# Patient Record
Sex: MALE | Race: WHITE | NOT HISPANIC OR LATINO
[De-identification: names, ages, dates, MRNs, and addresses within clinical notes are randomized per-mention and may not be internally consistent; named-entity substitution may affect disease eponyms.]

---

## 2018-07-11 ENCOUNTER — TRANSCRIPTION ENCOUNTER (OUTPATIENT)
Age: 53
End: 2018-07-11

## 2018-10-03 ENCOUNTER — OUTPATIENT (OUTPATIENT)
Dept: OUTPATIENT SERVICES | Facility: HOSPITAL | Age: 53
LOS: 1 days | Discharge: HOME | End: 2018-10-03

## 2018-10-03 DIAGNOSIS — R97.20 ELEVATED PROSTATE SPECIFIC ANTIGEN [PSA]: ICD-10-CM

## 2020-01-23 ENCOUNTER — TRANSCRIPTION ENCOUNTER (OUTPATIENT)
Age: 55
End: 2020-01-23

## 2021-07-26 ENCOUNTER — LABORATORY RESULT (OUTPATIENT)
Age: 56
End: 2021-07-26

## 2021-07-26 ENCOUNTER — APPOINTMENT (OUTPATIENT)
Dept: DISASTER EMERGENCY | Facility: CLINIC | Age: 56
End: 2021-07-26

## 2021-07-26 PROBLEM — Z00.00 ENCOUNTER FOR PREVENTIVE HEALTH EXAMINATION: Status: ACTIVE | Noted: 2021-07-26

## 2021-10-14 ENCOUNTER — TRANSCRIPTION ENCOUNTER (OUTPATIENT)
Age: 56
End: 2021-10-14

## 2022-04-07 ENCOUNTER — APPOINTMENT (OUTPATIENT)
Age: 57
End: 2022-04-07
Payer: COMMERCIAL

## 2022-04-07 VITALS
SYSTOLIC BLOOD PRESSURE: 120 MMHG | BODY MASS INDEX: 31.97 KG/M2 | HEIGHT: 72 IN | HEART RATE: 88 BPM | OXYGEN SATURATION: 98 % | DIASTOLIC BLOOD PRESSURE: 76 MMHG | RESPIRATION RATE: 14 BRPM | WEIGHT: 236 LBS

## 2022-04-07 PROCEDURE — 99213 OFFICE O/P EST LOW 20 MIN: CPT

## 2022-04-07 NOTE — REASON FOR VISIT
[Abnormal CXR/ Chest CT] : an abnormal CXR/ chest CT [Pulmonary Nodules] : pulmonary nodules [TextBox_44] : History of COVID has been doing pretty well.  He has had several nodular densities which have been stable.  Continues to be a heavy smoker however he is needs to have a repeat CAT scan of his chest.

## 2022-04-29 ENCOUNTER — NON-APPOINTMENT (OUTPATIENT)
Age: 57
End: 2022-04-29

## 2022-06-06 ENCOUNTER — APPOINTMENT (OUTPATIENT)
Dept: ORTHOPEDIC SURGERY | Facility: CLINIC | Age: 57
End: 2022-06-06
Payer: OTHER MISCELLANEOUS

## 2022-06-06 VITALS — BODY MASS INDEX: 31.97 KG/M2 | WEIGHT: 236 LBS | HEIGHT: 72 IN

## 2022-06-06 PROCEDURE — 99455 WORK RELATED DISABILITY EXAM: CPT

## 2022-06-06 PROCEDURE — 99072 ADDL SUPL MATRL&STAF TM PHE: CPT

## 2022-06-07 NOTE — WORK
[Has the patient reached Maximum Medical Improvement? If yes, indicate date___] : Yes, on [unfilled] [Is there permanent partial impairment?] : Yes [Right] : right [Yes] : Yes [FreeTextEntry8] : in exam [FreeTextEntry7] : knee [FreeTextEntry5] : 20% [de-identified] : 0-140

## 2022-06-07 NOTE — PHYSICAL EXAM
[Right] : right knee [TWNoteComboBox7] : flexion 105 degrees [de-identified] : extension 15 degrees

## 2022-12-05 ENCOUNTER — NON-APPOINTMENT (OUTPATIENT)
Age: 57
End: 2022-12-05

## 2023-01-11 ENCOUNTER — APPOINTMENT (OUTPATIENT)
Age: 58
End: 2023-01-11
Payer: COMMERCIAL

## 2023-01-11 PROCEDURE — 94010 BREATHING CAPACITY TEST: CPT

## 2023-01-11 PROCEDURE — 94727 GAS DIL/WSHOT DETER LNG VOL: CPT

## 2023-01-11 PROCEDURE — 94729 DIFFUSING CAPACITY: CPT

## 2023-03-04 ENCOUNTER — NON-APPOINTMENT (OUTPATIENT)
Age: 58
End: 2023-03-04

## 2023-03-14 ENCOUNTER — NON-APPOINTMENT (OUTPATIENT)
Age: 58
End: 2023-03-14

## 2023-04-24 ENCOUNTER — APPOINTMENT (OUTPATIENT)
Dept: ORTHOPEDIC SURGERY | Facility: CLINIC | Age: 58
End: 2023-04-24
Payer: OTHER MISCELLANEOUS

## 2023-04-24 PROCEDURE — 72110 X-RAY EXAM L-2 SPINE 4/>VWS: CPT

## 2023-04-24 PROCEDURE — 72050 X-RAY EXAM NECK SPINE 4/5VWS: CPT

## 2023-04-24 PROCEDURE — 99214 OFFICE O/P EST MOD 30 MIN: CPT

## 2023-04-24 NOTE — PHYSICAL EXAM
[de-identified] : TTP midline cervical spine and paraspinal musculature \par \par Strength                                                                    \par Deltoid\par   Right: 5/5; Left: 5/5                     \par Biceps\par   Right: 5/5; Left: 5/5                  \par Triceps     \par   Right: 5/5; Left: 5/5                                \par Wrist Extensors  \par   Right: 5/5; Left: 5/5\par Finger Flexors  \par   Right: 5/5; Left: 5/5\par IO \par   Right: 5/5; Left: 5/5\par \par Sensation\par C5\par   Right: 2/2; Left: 2/2\par C6\par   Right: 2/2; Left: 2/2\par C7\par   Right: 2/2; Left: 2/2\par C8\par   Right: 2/2; Left: 2/2\par T1\par   Right: 2/2; Left: 2/2\par \par Reflexes\par Biceps\par   Right: 2+; Left 2+\par Triceps\par   Right: 2+; Left 2+\par Martin's\par  Right: Negative; L: Negative\par \par TTP midline spine and paraspinal musculature \par Strength                                         \par Hip flexor\par   Right: 5/5; Left: 5/5                             \par Knee extensor  \par   Right: 5/5; Left: 5/5                     \par Ankle dorsiflexion\par   Right: 5/5; Left: 5/5                  \par EHL        \par   Right: 5/5; Left: 5/5                                \par Ankle plantarflexion    \par   Right: 5/5; Left: 5/5\par \par Sensation\par L1\par   Right: 2/2; Left: 2/2\par L2\par   Right: 2/2; Left: 2/2\par L3\par   Right: 2/2; Left: 2/2\par L4\par   Right: 2/2; Left: 2/2\par L5\par   Right: 2/2; Left: 2/2\par S1\par   Right: 2/2; Left: 2/2\par \par Reflexes\par Patella\par   Right: 2+; Left 2+\par Achilles\par   Right: 2+; Left 2+\par Clonus\par  Right: absent; L: absent\par

## 2023-04-24 NOTE — DISCUSSION/SUMMARY
[de-identified] : 58-year-old male with lumbar and cervical radiculopathy secondary to a work-related accident.  Given the patient prescription for physical therapy he will follow-up with interventional pain management Dr. Wright.  The patient is unable to work given that the pain is severe.  He has no light duty at his job as a  at the [employer].  The patient is temporarily 100% disabled as a result of his work related injury.  I advised him to stay out of work until his symptoms improve after physical therapy and injections.

## 2023-04-24 NOTE — DATA REVIEWED
[FreeTextEntry1] : I obtained and reviewed AP lateral flexion-extension lumbar x-rays and cervical x-rays.  The cervical spine the patient has significant degeneration at C5-6 C6-7.  There is mild to moderate disc degeneration lumbar spine.  No instability.

## 2023-04-24 NOTE — HISTORY OF PRESENT ILLNESS
[de-identified] : 58-year-old male presents status post work-related accident March 3, 2023.  Patient presented today.  He has neck pain that radiates to his upper shoulders.  He has lumbar pain that radiates into his left lateral thigh.  In the past he went all the way down his right leg.  The pain is very significant and interferes with his day-to-day.  The patient is not on physical therapy.  He denies any balance issues or bladder loss of bowel.  Denies hand clumsiness.  Has not had any recent injections.  He did have a cervical epidural steroid injection years ago and he did get relief from that.

## 2023-05-24 ENCOUNTER — APPOINTMENT (OUTPATIENT)
Dept: PAIN MANAGEMENT | Facility: CLINIC | Age: 58
End: 2023-05-24
Payer: OTHER MISCELLANEOUS

## 2023-05-24 VITALS — WEIGHT: 215 LBS | BODY MASS INDEX: 30.1 KG/M2 | HEIGHT: 71 IN

## 2023-05-24 VITALS — HEIGHT: 71 IN | BODY MASS INDEX: 30.1 KG/M2 | WEIGHT: 215 LBS

## 2023-05-24 PROCEDURE — 96102W: CUSTOM

## 2023-05-24 PROCEDURE — 99244 OFF/OP CNSLTJ NEW/EST MOD 40: CPT

## 2023-05-24 NOTE — PHYSICAL EXAM
[de-identified] : GENERAL APPEARANCE OF PATIENT IS WELL DEVELOPED, WELL NOURISHED, BODY HABITUS NORMAL, WELL GROOMED, NO DEFORMITIES NOTED. \par Head - Atraumatic and Normocephalic \par Eyes, Nose, and Throat: External inspection of ears and nose are normal overall without scars, lesions, or masses noted. Assessment of hearing is normal\par Neck-Examination of neck shows no masses, overall appearance is normal, neck is symmetric, tracheal position is midline, no crepitus is noted. Examination of thyroid shows no enlargement, tenderness or masses\par Respiratory- Assessment of respiratory effort shows no intercostal retractions, no use of accessory muscles, unlabored breathing, and normal diaphragmatic movement.\par Cardiovascular- Examination of extremities show no edema or varicosities\par Musculoskeletal. Examination of gait is not antalgic and station is normal\par Inspection and palpation of digits and nails shows no clubbing, cyanosis, nodules, drainage, fluctuance, petechiae\par \par • Spine – 90 degrees in flexion. 10-15 degrees in extension with pain. \par \par \par • Neck- pain at 10 degrees in both flexion and extension. Trigger point in the right cervical paravertebral area. Pain on right C3-6 facets. \par \par \par • RUE- inspection and palpation shows no misalignment, asymmetry, crepitation, defects, tenderness, masses, effusions. ROM is normal without crepitation or contracture. No instability or subluxation or laxity is noted. No abnormal movements.\par \par \par • LUE- inspection and palpation shows no misalignment, asymmetry, crepitation, defects, tenderness, masses, effusions. ROM is normal without crepitation or contracture. No instability or subluxation or laxity is noted. No abnormal movements.\par \par \par • RLE- inspection and palpation shows no misalignment, asymmetry, crepitation, defects, tenderness, masses, effusions. ROM is normal without crepitation or contracture. No instability or subluxation or laxity is noted. No abnormal movements.\par \par \par • LLE- inspection and palpation shows no misalignment, asymmetry, crepitation, defects, tenderness, masses, effusions. ROM is normal without crepitation or contracture. No instability or subluxation or laxity is noted. No abnormal movements.\par \par \par • Skin- Inspection of skin and subcutaneous tissue shows no rashes, lesions or ulcers. Palpation of skin and subcutaneous tissue shows no rashes, no indurations, subcutaneous nodules or tightening.\par \par \par • Abdomen- Nontender\par \par \par • Neurologic- CN 2-12 are grossly intact. No sensory or motor deficits in the upper and lower extremities. Adequate strength in upper and lower extremities \par \par \par • Psychiatric- Patient’s judgment and insight are intact. Oriented to time, place and person. Recent and remote memory intact.\par

## 2023-05-24 NOTE — HISTORY OF PRESENT ILLNESS
[FreeTextEntry1] : HISTORY OF PRESENT ILLNESS: This is a 58 year old man complaining of neck and lower back pain. The patient has had this pain for 3 months. The pain started after a work related injury which took place on 03/03/2023. Patient describes pain as moderate to severe. During the last month the pain has been nearly constant with symptoms worsening in no typical pattern. Pain described as sharp and shooting. Pain is not associated with numbness or weakness.  Pain is increased with lying down, standing, sitting, walking, exercise, and coughing/sneezing. Bowel or bladder habits have not changed.\par  \par ACTIVITIES: Patient could walk 1 block before the pain starts. Patient can sit 1 hour  before pain starts. Patient can stand 30 minutes before pain starts. The patient sometimes lays down because of pain. Patient uses no assistive walking device at this time. Patient has difficulty going to work, performing household chores, doing yard work or shopping, participating in recreational activities, and exercising at this time.\par \par PRIOR PAIN TREATMENTS:  No prior pain treatment\par \par PRIOR PAIN MEDICATIONS:  Tylenol\par

## 2023-05-24 NOTE — ASSESSMENT
[FreeTextEntry1] : Mr. Pool Ulloa is a 58 year old male with a chief complaint of neck and lower back pain. Pain resulted from a work related injury which took place on 3/3/23. He was working for the [employer] lifting and throwing heavy objects when he injured his back and neck. He had x-rays of the cervical and lumbar spine which were reviewed in detail during today's encounter. \par \par At this time we will order MRIs of the cervical and lumbar spine. A cervical and lumbar MRI were ordered to delineate spine pathology such as disc displacement and stenosis. Patient will follow up in 4 weeks to review imaging results and discuss further treatment options. \par \par Disability status: total temporary disabled \par \par I, Evi Montenegro, attest that this documentation has been prepared under the direction and in the presence of Provider William Wright DO\par The documentation recorded by the scribe, in my presence, accurately reflects the service I personally performed, and the decisions made by me with my edits as appropriate.\par \par Best Regards, \par William Wright D.ZANE. \par Diplomat, American Board of Anesthesiology \par Diplomat, American Board of Pain Medicine \par Diplomat, American Board of Pain Management

## 2023-06-21 ENCOUNTER — APPOINTMENT (OUTPATIENT)
Dept: PAIN MANAGEMENT | Facility: CLINIC | Age: 58
End: 2023-06-21
Payer: OTHER MISCELLANEOUS

## 2023-06-21 VITALS — BODY MASS INDEX: 30.1 KG/M2 | HEIGHT: 71 IN | WEIGHT: 215 LBS

## 2023-06-21 PROCEDURE — 99214 OFFICE O/P EST MOD 30 MIN: CPT

## 2023-06-21 NOTE — PHYSICAL EXAM
[de-identified] : GENERAL APPEARANCE OF PATIENT IS WELL DEVELOPED, WELL NOURISHED, BODY HABITUS NORMAL, WELL GROOMED, NO DEFORMITIES NOTED. \par Head - Atraumatic and Normocephalic \par Eyes, Nose, and Throat: External inspection of ears and nose are normal overall without scars, lesions, or masses noted. Assessment of hearing is normal\par Neck-Examination of neck shows no masses, overall appearance is normal, neck is symmetric, tracheal position is midline, no crepitus is noted. Examination of thyroid shows no enlargement, tenderness or masses\par Respiratory- Assessment of respiratory effort shows no intercostal retractions, no use of accessory muscles, unlabored breathing, and normal diaphragmatic movement.\par Cardiovascular- Examination of extremities show no edema or varicosities\par Musculoskeletal. Examination of gait is not antalgic and station is normal\par Inspection and palpation of digits and nails shows no clubbing, cyanosis, nodules, drainage, fluctuance, petechiae\par \par • Spine – 90 degrees in flexion. 10-15 degrees in extension with pain. \par \par \par • Neck- pain at 10 degrees in both flexion and extension. Trigger point in the right cervical paravertebral area. Pain on right C3-6 facets. \par \par \par • RUE- inspection and palpation shows no misalignment, asymmetry, crepitation, defects, tenderness, masses, effusions. ROM is normal without crepitation or contracture. No instability or subluxation or laxity is noted. No abnormal movements.\par \par \par • LUE- inspection and palpation shows no misalignment, asymmetry, crepitation, defects, tenderness, masses, effusions. ROM is normal without crepitation or contracture. No instability or subluxation or laxity is noted. No abnormal movements.\par \par \par • RLE- inspection and palpation shows no misalignment, asymmetry, crepitation, defects, tenderness, masses, effusions. ROM is normal without crepitation or contracture. No instability or subluxation or laxity is noted. No abnormal movements.\par \par \par • LLE- inspection and palpation shows no misalignment, asymmetry, crepitation, defects, tenderness, masses, effusions. ROM is normal without crepitation or contracture. No instability or subluxation or laxity is noted. No abnormal movements.\par \par \par • Skin- Inspection of skin and subcutaneous tissue shows no rashes, lesions or ulcers. Palpation of skin and subcutaneous tissue shows no rashes, no indurations, subcutaneous nodules or tightening.\par \par \par • Abdomen- Nontender\par \par \par • Neurologic- CN 2-12 are grossly intact. No sensory or motor deficits in the upper and lower extremities. Adequate strength in upper and lower extremities \par \par \par • Psychiatric- Patient’s judgment and insight are intact. Oriented to time, place and person. Recent and remote memory intact.\par

## 2023-06-21 NOTE — ASSESSMENT
[FreeTextEntry1] : Mr. Pool Ulloa is a 58 year old male with a chief complaint of neck and lower back pain. Pain resulted from a work related injury which took place on 3/3/23. He was working for the [employer] lifting and throwing heavy objects when he injured his back and neck. He has been trialing physical therapy and completed 6 sessions with no relief. He has also trialed motrin and tylenol with no relief of pain. He is about 3 months out from his injury and is not improving. \par \par At this time we will order MRIs of the cervical and lumbar spine. A cervical and lumbar MRI were ordered to delineate spine pathology such as disc displacement and stenosis. Patient will follow up in 4 weeks to review imaging results and discuss further treatment options. \par \par Disability status: total temporary disabled \par \par I, Evi Montenegro, attest that this documentation has been prepared under the direction and in the presence of Provider William Wright DO\par The documentation recorded by the scribe, in my presence, accurately reflects the service I personally performed, and the decisions made by me with my edits as appropriate.\par \par Best Regards, \par William Wright D.O. \par Diplomat, American Board of Anesthesiology \par Diplomat, American Board of Pain Medicine \par Diplomat, American Board of Pain Management

## 2023-06-21 NOTE — HISTORY OF PRESENT ILLNESS
[FreeTextEntry1] : HISTORY OF PRESENT ILLNESS: This is a 58 year old man complaining of neck and lower back pain. The patient has had this pain for 3 months. The pain started after a work related injury which took place on 03/03/2023. 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 Patient describes pain as moderate to severe. During the last month the pain has been nearly constant with symptoms worsening in no typical pattern. Pain described as sharp and shooting. Pain is not associated with numbness or weakness.  Pain is increased with lying down, standing, sitting, walking, exercise, and coughing/sneezing. Bowel or bladder habits have not changed.\par  \par ACTIVITIES: Patient could walk 1 block before the pain starts. Patient can sit 1 hour  before pain starts. Patient can stand 30 minutes before pain starts. The patient sometimes lays down because of pain. Patient uses no assistive walking device at this time. Patient has difficulty going to work, performing household chores, doing yard work or shopping, participating in recreational activities, and exercising at this time.\par \par PRIOR PAIN TREATMENTS:  No prior pain treatment\par \par PRIOR PAIN MEDICATIONS:  Tylenol\par \par PRESENTING TODAY: In revisit encounter in regard to his continued neck and lower back pain. He has been trialing physical therapy with no relief. He has also trialed motrin and tylenol with no relief of pain. He is about 3 months out from his injury and is not improving. \par

## 2023-06-26 ENCOUNTER — NON-APPOINTMENT (OUTPATIENT)
Age: 58
End: 2023-06-26

## 2023-07-23 ENCOUNTER — FORM ENCOUNTER (OUTPATIENT)
Age: 58
End: 2023-07-23

## 2023-07-31 ENCOUNTER — APPOINTMENT (OUTPATIENT)
Dept: MRI IMAGING | Facility: CLINIC | Age: 58
End: 2023-07-31

## 2023-08-02 ENCOUNTER — APPOINTMENT (OUTPATIENT)
Dept: PAIN MANAGEMENT | Facility: CLINIC | Age: 58
End: 2023-08-02

## 2023-08-15 ENCOUNTER — APPOINTMENT (OUTPATIENT)
Dept: PAIN MANAGEMENT | Facility: CLINIC | Age: 58
End: 2023-08-15
Payer: OTHER MISCELLANEOUS

## 2023-08-15 VITALS
HEART RATE: 57 BPM | HEIGHT: 71 IN | SYSTOLIC BLOOD PRESSURE: 152 MMHG | DIASTOLIC BLOOD PRESSURE: 93 MMHG | WEIGHT: 215 LBS | BODY MASS INDEX: 30.1 KG/M2

## 2023-08-15 PROCEDURE — 99214 OFFICE O/P EST MOD 30 MIN: CPT | Mod: ACP

## 2023-08-15 NOTE — PHYSICAL EXAM
[] : pain with lateral rotation [Flexion] : flexion [Extension] : extension [de-identified] : GENERAL APPEARANCE OF PATIENT IS WELL DEVELOPED, WELL NOURISHED, BODY HABITUS NORMAL, WELL GROOMED, NO DEFORMITIES NOTED. \par  Head - Atraumatic and Normocephalic \par  Eyes, Nose, and Throat: External inspection of ears and nose are normal overall without scars, lesions, or masses noted. Assessment of hearing is normal\par  Neck-Examination of neck shows no masses, overall appearance is normal, neck is symmetric, tracheal position is midline, no crepitus is noted. Examination of thyroid shows no enlargement, tenderness or masses\par  Respiratory- Assessment of respiratory effort shows no intercostal retractions, no use of accessory muscles, unlabored breathing, and normal diaphragmatic movement.\par  Cardiovascular- Examination of extremities show no edema or varicosities\par  Musculoskeletal. Examination of gait is not antalgic and station is normal\par  Inspection and palpation of digits and nails shows no clubbing, cyanosis, nodules, drainage, fluctuance, petechiae\par  \par  - Spine - 90 degrees in flexion. 10-15 degrees in extension with pain. \par  \par  \par  - Neck- pain at 10 degrees in both flexion and extension. Trigger point in the right cervical paravertebral area. Pain on right C3-6 facets. \par  \par  \par  - RUE- inspection and palpation shows no misalignment, asymmetry, crepitation, defects, tenderness, masses, effusions. ROM is normal without crepitation or contracture. No instability or subluxation or laxity is noted. No abnormal movements.\par  \par  \par  - LUE- inspection and palpation shows no misalignment, asymmetry, crepitation, defects, tenderness, masses, effusions. ROM is normal without crepitation or contracture. No instability or subluxation or laxity is noted. No abnormal movements.\par  \par  \par  - RLE- inspection and palpation shows no misalignment, asymmetry, crepitation, defects, tenderness, masses, effusions. ROM is normal without crepitation or contracture. No instability or subluxation or laxity is noted. No abnormal movements.\par  \par  \par  - LLE- inspection and palpation shows no misalignment, asymmetry, crepitation, defects, tenderness, masses, effusions. ROM is normal without crepitation or contracture. No instability or subluxation or laxity is noted. No abnormal movements.\par  \par  \par  - Skin- Inspection of skin and subcutaneous tissue shows no rashes, lesions or ulcers. Palpation of skin and subcutaneous tissue shows no rashes, no indurations, subcutaneous nodules or tightening.\par  \par  \par  - Abdomen- Nontender\par  \par  \par  - Neurologic- CN 2-12 are grossly intact. No sensory or motor deficits in the upper and lower extremities. Adequate strength in upper and lower extremities \par  \par  \par  - Psychiatric- Patient's judgment and insight are intact. Oriented to time, place and person. Recent and remote memory intact.\par

## 2023-08-15 NOTE — DATA REVIEWED
[FreeTextEntry1] : LS MRI 08/09/23: L4-5 moderate sentral canal stenosis with b/l L5 abutement; L2-3 central canal stenosis with L3 abutement.  CS MRI 08/0-9/23: C3-4, C4-5,C5-6,C6-7 foramenal stenosis.

## 2023-08-15 NOTE — HISTORY OF PRESENT ILLNESS
[FreeTextEntry1] : HISTORY OF PRESENT ILLNESS: This is a 58 year old man complaining of neck and lower back pain. The patient has had this pain for 3 months. The pain started after a work related injury which took place on 03/03/2023. 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 Patient describes pain as moderate to severe. During the last month the pain has been nearly constant with symptoms worsening in no typical pattern. Pain described as sharp and shooting. Pain is not associated with numbness or weakness.  Pain is increased with lying down, standing, sitting, walking, exercise, and coughing/sneezing. Bowel or bladder habits have not changed.   ACTIVITIES: Patient could walk 1 block before the pain starts. Patient can sit 1 hour  before pain starts. Patient can stand 30 minutes before pain starts. The patient sometimes lays down because of pain. Patient uses no assistive walking device at this time. Patient has difficulty going to work, performing household chores, doing yard work or shopping, participating in recreational activities, and exercising at this time.  PRIOR PAIN TREATMENTS:  No prior pain treatment  PRIOR PAIN MEDICATIONS:  Tylenol  PRESENTING TODAY: Last seen on 06/21/2023 and today presents with persistent neck and low back pain. He tried over six weeks of  Physical Therapy with no relief. He has also tried Motrin and Tylenol with no relief of pain. He is about 6 months out from his injury and is not improving.  He underwent Lumbar and Cervical spine MRI which we reviewed today. Based on the results it is strongly recommended to try epidural injections, which we will request today.

## 2023-08-15 NOTE — ASSESSMENT
[FreeTextEntry1] : Mr. Pool Ulloa is a 58 year old male with a chief complaint of neck and lower back pain. Pain resulted from a work related injury which took place on 3/3/23. He was working for the [employer] lifting and throwing heavy objects when he injured his back and neck. He has been trialing physical therapy and completed 6 weeks with no relief. He has also trialed Motrin and Tylenol with no relief of pain. He is about 3 months out from his injury and is not improving. Based on the results of MRIs we will request epidural injections.  CERVICAL EPIDURAL STEROID INJECTION: Patient had a MRI that shows a radicular component along with pain referred into the upper extremity. Patient has trialed rehab (Home exercise, physical therapy or chiropractic care) and medications. I will schedule a C4-C6 cervical epidural steroid injection.  EPIDURAL SNRI PARAGRAPH: Patient had a MRI that shows a radicular component along with pain referred into the lower extremity. Patient has trialed rehab (Home exercise, physical therapy or chiropractic care) and medications I will schedule a b/l L4-5 SNRI.  Risk, benefits, pros and cons of procedure were explained to the patient using models and diagrams and their questions were answered.  The patient has severe anxiety of procedures that necessitates monitored anesthesia care (MAC). The procedure performed will be close to major nerves, arteries, and spinal cord and/or joint structures. Due to the proximity of these structures, we need the patient to be still during the procedure. With the help of MAC, this will be safely achieved and decrease the risk of any complications.    Disability status: total temporary disabled    BEKAH Calhoun D.O.

## 2023-08-30 ENCOUNTER — APPOINTMENT (OUTPATIENT)
Dept: PAIN MANAGEMENT | Facility: CLINIC | Age: 58
End: 2023-08-30
Payer: OTHER MISCELLANEOUS

## 2023-08-30 PROCEDURE — 93040 RHYTHM ECG WITH REPORT: CPT | Mod: 79

## 2023-08-30 PROCEDURE — 62321 NJX INTERLAMINAR CRV/THRC: CPT

## 2023-08-30 PROCEDURE — 00600 ANES PX CRV SPINE&CORD NOS: CPT | Mod: QZ,P2

## 2023-08-30 PROCEDURE — 94761 N-INVAS EAR/PLS OXIMETRY MLT: CPT

## 2023-08-30 PROCEDURE — 93770 DETERMINATION VENOUS PRESS: CPT

## 2023-08-30 PROCEDURE — 62323 NJX INTERLAMINAR LMBR/SAC: CPT

## 2023-08-31 NOTE — PROCEDURE
[FreeTextEntry1] : CAUDAL EPIDURAL STEROID INJECTION [FreeTextEntry3] : CERVICAL EPIDURAL STEROID INJECTION UNDER FLUOROSCOPY  Preoperative Diagnosis: Cervical radiculopathy  Postoperative Diagnosis: Same Procedure: Translaminar Cervical Epidural Injection under fluoroscopy Physician: William Wright D.O. Anesthesiologist/CRNA: Dr. Aguila, Ms. Grant Anesthesia: See nurses note. MAC/Cold spray.  Medical Necessity: Failure of conservative management.  CONSENT: The possible complications including infection, bleeding, nerve damage, hospital admission, death or failure of the procedure; though unusual, are theoretically possible. The patient was educated about the of the procedure and alternative therapies. All questions were answered and the patient freely gave consent to proceed. Indication for Fluoroscopy: This procedure requires the precise placement of the spinal needle. It is the only way to accurately and safely perform the injection. Monitoring: Patient had continuous blood pressure, EKG, and pulse oximetry throughout the case. See nurse's notes. After obtaining written consent, the After obtaining written consent, the patient was positioned prone on the fluoroscopy table. The back to her neck and upper thorax was prepped with Betadine and draped in usual sterile fashion. A time out was performed. The C7-T1 interspace was identified using fluoroscopy. The skin was infiltrated with cold spray and lidocaine 1% -- 1 cc for subcutaneous analgesia. The epidural space was identified using a 18g touhy needle with a midline approach using a loss of resistance technique. 2cc omnipaque was used to define the space. A solution of 5 ml of preservative-free sterile saline and 1ml of Methylprednisolone 80mg, 1cc was infused with minimal pressure on the syringe into the epidural space. The procedure was tolerated well. There was no evidence of CSF, Paresthesias nor heme. The needle was removed intact. A band aid was place on the site.  Epidurogram: No signs of epidural fibrosis.    Complications: None. The patient tolerated the procedure well.  Disposition: I have examined the patient and there are no new physical findings since the original presentation. Sensory and motor function were intact. The patient met discharge criteria see nurses notes. The discharge instruction sheet was reviewed and given to the patient. The patient was discharged home with a . If patient gets sustained relief will have patient do shoulder griddle strengthening with Thera bands and walking.  Comment: 1st THADDEUS today, schedule 2nd in 1-2 weeks depending of effectiveness vs follow up in office depending on the insurance. Call if any problems.   This document was electronically signed by: William Wright D.O. Diplomat, American Board of Anesthesiology Diplomat, American Board of Pain Medicine Diplomat, American Board of Pain Management

## 2023-09-13 ENCOUNTER — APPOINTMENT (OUTPATIENT)
Dept: PAIN MANAGEMENT | Facility: CLINIC | Age: 58
End: 2023-09-13
Payer: OTHER MISCELLANEOUS

## 2023-09-13 PROCEDURE — 93040 RHYTHM ECG WITH REPORT: CPT | Mod: 79

## 2023-09-13 PROCEDURE — 00630 ANES PX LUMBAR REGION NOS: CPT | Mod: QZ,P3

## 2023-09-13 PROCEDURE — 93770 DETERMINATION VENOUS PRESS: CPT

## 2023-09-13 PROCEDURE — 64483 NJX AA&/STRD TFRM EPI L/S 1: CPT | Mod: 50

## 2023-09-13 PROCEDURE — 94761 N-INVAS EAR/PLS OXIMETRY MLT: CPT

## 2023-09-18 ENCOUNTER — APPOINTMENT (OUTPATIENT)
Dept: PAIN MANAGEMENT | Facility: CLINIC | Age: 58
End: 2023-09-18
Payer: OTHER MISCELLANEOUS

## 2023-09-18 VITALS — WEIGHT: 215 LBS | BODY MASS INDEX: 30.1 KG/M2 | HEIGHT: 71 IN

## 2023-09-18 DIAGNOSIS — M54.16 RADICULOPATHY, LUMBAR REGION: ICD-10-CM

## 2023-09-18 DIAGNOSIS — M54.12 RADICULOPATHY, CERVICAL REGION: ICD-10-CM

## 2023-09-18 PROCEDURE — 99214 OFFICE O/P EST MOD 30 MIN: CPT

## 2023-10-12 ENCOUNTER — APPOINTMENT (OUTPATIENT)
Dept: PAIN MANAGEMENT | Facility: CLINIC | Age: 58
End: 2023-10-12
Payer: OTHER MISCELLANEOUS

## 2023-10-12 VITALS
HEART RATE: 80 BPM | BODY MASS INDEX: 30.1 KG/M2 | WEIGHT: 215 LBS | DIASTOLIC BLOOD PRESSURE: 73 MMHG | HEIGHT: 71 IN | SYSTOLIC BLOOD PRESSURE: 113 MMHG

## 2023-10-12 DIAGNOSIS — M79.18 MYALGIA, OTHER SITE: ICD-10-CM

## 2023-10-12 PROCEDURE — 99214 OFFICE O/P EST MOD 30 MIN: CPT

## 2023-10-20 ENCOUNTER — APPOINTMENT (OUTPATIENT)
Dept: ORTHOPEDIC SURGERY | Facility: CLINIC | Age: 58
End: 2023-10-20
Payer: OTHER MISCELLANEOUS

## 2023-10-20 PROCEDURE — 99214 OFFICE O/P EST MOD 30 MIN: CPT

## 2024-01-10 ENCOUNTER — APPOINTMENT (OUTPATIENT)
Dept: ORTHOPEDIC SURGERY | Facility: CLINIC | Age: 59
End: 2024-01-10
Payer: OTHER MISCELLANEOUS

## 2024-01-10 DIAGNOSIS — M54.16 RADICULOPATHY, LUMBAR REGION: ICD-10-CM

## 2024-01-10 DIAGNOSIS — M54.12 RADICULOPATHY, CERVICAL REGION: ICD-10-CM

## 2024-01-10 PROCEDURE — 99214 OFFICE O/P EST MOD 30 MIN: CPT

## 2024-01-10 NOTE — DISCUSSION/SUMMARY
[de-identified] : 58-year-old male because of a work-related accident and now has an exacerbation of his neck pain and low back pain.  I urged the Worker's Compensation board to approve this medically necessary treatment of physical therapy.  Physical therapy as a treatment for neck pain and low back pain due to an injury.  In the past this treatment has actually helped the patient when he stopped doing it the pain returned and has only worsened.  The patient will also follow-up with Dr. Blue from interventional pain management.

## 2024-01-10 NOTE — HISTORY OF PRESENT ILLNESS
[de-identified] : 58-year-old male presents with low back pain and neck pain.  This has been going on because of a work-related accident the patient sustained in March 2023.  Initially the patient did physical therapy which started to help however the physical therapy then was completed and the patient's symptoms all recurred.  Recently the patient had exacerbation of his condition and his pain is low back and neck has severely worsened.  It is causing him dysfunction and inability to be without pain.  Did not see any radiation to his arms or legs.  No loss of bladder or bowel

## 2024-01-10 NOTE — IMAGING
[de-identified] : TTP midline spine and paraspinal musculature  Strength                                          Hip flexor   Right: 5/5; Left: 5/5                              Knee extensor     Right: 5/5; Left: 5/5                      Ankle dorsiflexion   Right: 5/5; Left: 5/5                   EHL           Right: 5/5; Left: 5/5                                 Ankle plantarflexion       Right: 5/5; Left: 5/5  Sensation L1   Right: 2/2; Left: 2/2 L2   Right: 2/2; Left: 2/2 L3   Right: 2/2; Left: 2/2 L4   Right: 2/2; Left: 2/2 L5   Right: 2/2; Left: 2/2 S1   Right: 2/2; Left: 2/2  Reflexes Patella   Right: 2+; Left 2+ Achilles   Right: 2+; Left 2+ Clonus  Right: absent; L: absent TTP midline cervical spine and paraspinal musculature   Strength                                                                     Deltoid   Right: 5/5; Left: 5/5                      Biceps   Right: 5/5; Left: 5/5                   Triceps        Right: 5/5; Left: 5/5                                 Wrist Extensors     Right: 5/5; Left: 5/5 Finger Flexors     Right: 5/5; Left: 5/5 IO    Right: 5/5; Left: 5/5  Sensation C5   Right: 2/2; Left: 2/2 C6   Right: 2/2; Left: 2/2 C7   Right: 2/2; Left: 2/2 C8   Right: 2/2; Left: 2/2 T1   Right: 2/2; Left: 2/2  Reflexes Biceps   Right: 2+; Left 2+ Triceps   Right: 2+; Left 2+ Martin's  Right: Negative; L: Negative

## 2024-02-14 ENCOUNTER — APPOINTMENT (OUTPATIENT)
Dept: PAIN MANAGEMENT | Facility: CLINIC | Age: 59
End: 2024-02-14
Payer: OTHER MISCELLANEOUS

## 2024-02-14 DIAGNOSIS — M54.50 LOW BACK PAIN, UNSPECIFIED: ICD-10-CM

## 2024-02-14 PROCEDURE — 99215 OFFICE O/P EST HI 40 MIN: CPT

## 2024-02-14 NOTE — ASSESSMENT
[FreeTextEntry1] : Mr. Pool Ulloa is a 58-year-old male with a chief complaint of neck and lower back pain. Pain resulted from a work-related injury which took place on 3/3/23. He was working for the [employer] lifting and throwing heavy objects when he injured his back and neck. His lower back pain is his chief complaint. Previous caudal ALMA injection provided him with no sustained relief. He then underwent a bilateral L4-5 transforaminal ALMA on 9/13/23 which provided him with no relief as well. Patient recently followed up with Dr. Martinez who discussed that he trial an RFA. We will proceed with B/L L3-S1 diagnostic medical branch blocks w/ mac. Patient will follow up afterwards for reassessment. All this patients questions were answered and the conversation was understood well.  Patient had paravertebral tenderness and pain on spinal movement with facet loading. Patient has trialed rehab (home exercise, physical therapy or chiropractic care) and medications. I will schedule a bilateral diagnostic lumbar medial branch injection L3-S1, if 70% immediate relief for greater than 30 minutes or 50% greater than 24 hours, would schedule RFA at lumbar medial branches. If greater than 50% intermediate relief for 30 minutes, would schedule a second diagnostic lumbar medial branch block, and if greater than 50% intermediate relief for 30 minutes, would schedule a RFA at lumbar medial branches.  The patient has severe anxiety of procedures that necessitates monitored anesthesia care (MAC). The procedure performed will be close to major nerves, arteries, and spinal cord and/or joint structures. Due to the proximity of these structures, we need the patient to be still during the procedure. With the help of MAC, this will be safely achieved and decrease the risk of any complications.  RISK AND BENEFIT PARAGRAPH: Risk, benefits, pros and cons of procedure were explained to the patient using models and diagrams and their questions were answered.  I, Eufemia Horowitz, attest that this documentation has been prepared under the direction and in the presence of Provider Zita Blue MD.   Thank you for allowing me to assist in the management of this patient.    Best Regards,    Zita Blue M.D., FAAPMR   Diplomate, American Board of Physical Medicine and Rehabilitation Diplomate, American Board of Pain Medicine  Diplomate, American Board of Pain Management

## 2024-02-14 NOTE — WORK
[Was the competent medical cause of the injury] : was the competent medical cause of the injury [Are consistent with the injury] : are consistent with the injury [Consistent with my objective findings] : consistent with my objective findings [Total (100%)] : total (100%) [Does not reveal pre-existing condition(s) that may affect treatment/prognosis] : does not reveal pre-existing condition(s) that may affect treatment/prognosis [Patient] : patient [No] : No [No Rx restrictions] : No Rx restrictions. [I provided the services listed above] :  I provided the services listed above. [FreeTextEntry1] : fair [Less than Sedentary Work:] :  Less than Sedentary Work: Unable to meet the requirement of Sedentary Work.

## 2024-02-14 NOTE — DATA REVIEWED
[FreeTextEntry1] : LS MRI 08/09/23: L4-5 moderate sentral canal stenosis with b/l L5 abutement; L2-3 central canal stenosis with L3 abutement.  CS MRI 08/0-9/23: C3-4, C4-5,C5-6,C6-7 foramenal stenosis.  UDS: No data obtained today.  [place] REGISTRY: Checked.

## 2024-02-14 NOTE — PHYSICAL EXAM
[] : pain with lateral rotation [Flexion] : flexion [Extension] : extension [de-identified] : GENERAL APPEARANCE OF PATIENT IS WELL DEVELOPED, WELL NOURISHED, BODY HABITUS NORMAL, WELL GROOMED, NO DEFORMITIES NOTED. \par  Head - Atraumatic and Normocephalic \par  Eyes, Nose, and Throat: External inspection of ears and nose are normal overall without scars, lesions, or masses noted. Assessment of hearing is normal\par  Neck-Examination of neck shows no masses, overall appearance is normal, neck is symmetric, tracheal position is midline, no crepitus is noted. Examination of thyroid shows no enlargement, tenderness or masses\par  Respiratory- Assessment of respiratory effort shows no intercostal retractions, no use of accessory muscles, unlabored breathing, and normal diaphragmatic movement.\par  Cardiovascular- Examination of extremities show no edema or varicosities\par  Musculoskeletal. Examination of gait is not antalgic and station is normal\par  Inspection and palpation of digits and nails shows no clubbing, cyanosis, nodules, drainage, fluctuance, petechiae\par  \par  - Spine - 90 degrees in flexion. 10-15 degrees in extension with pain. \par  \par  \par  - Neck- pain at 10 degrees in both flexion and extension. Trigger point in the right cervical paravertebral area. Pain on right C3-6 facets. \par  \par  \par  - RUE- inspection and palpation shows no misalignment, asymmetry, crepitation, defects, tenderness, masses, effusions. ROM is normal without crepitation or contracture. No instability or subluxation or laxity is noted. No abnormal movements.\par  \par  \par  - LUE- inspection and palpation shows no misalignment, asymmetry, crepitation, defects, tenderness, masses, effusions. ROM is normal without crepitation or contracture. No instability or subluxation or laxity is noted. No abnormal movements.\par  \par  \par  - RLE- inspection and palpation shows no misalignment, asymmetry, crepitation, defects, tenderness, masses, effusions. ROM is normal without crepitation or contracture. No instability or subluxation or laxity is noted. No abnormal movements.\par  \par  \par  - LLE- inspection and palpation shows no misalignment, asymmetry, crepitation, defects, tenderness, masses, effusions. ROM is normal without crepitation or contracture. No instability or subluxation or laxity is noted. No abnormal movements.\par  \par  \par  - Skin- Inspection of skin and subcutaneous tissue shows no rashes, lesions or ulcers. Palpation of skin and subcutaneous tissue shows no rashes, no indurations, subcutaneous nodules or tightening.\par  \par  \par  - Abdomen- Nontender\par  \par  \par  - Neurologic- CN 2-12 are grossly intact. No sensory or motor deficits in the upper and lower extremities. Adequate strength in upper and lower extremities \par  \par  \par  - Psychiatric- Patient's judgment and insight are intact. Oriented to time, place and person. Recent and remote memory intact.\par

## 2024-03-13 ENCOUNTER — APPOINTMENT (OUTPATIENT)
Dept: PAIN MANAGEMENT | Facility: CLINIC | Age: 59
End: 2024-03-13

## 2024-03-13 NOTE — HISTORY OF PRESENT ILLNESS
[FreeTextEntry1] : ORIGINAL PRESENTATION: This is a 58-year-old man complaining of neck and lower back pain. The patient has had this pain for 3 months. The pain started after a work-related injury which took place on 03/03/2023. 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 Patient describes pain as moderate to severe. During the last month the pain has been nearly constant with symptoms worsening in no typical pattern. Pain described as sharp and shooting. Pain is not associated with numbness or weakness.  Pain is increased with lying down, standing, sitting, walking, exercise, and coughing/sneezing. Bowel or bladder habits have not changed.   ACTIVITIES: Patient could walk 1 block before the pain starts. Patient can sit 1 hour  before pain starts. Patient can stand 30 minutes before pain starts. The patient sometimes lays down because of pain. Patient uses no assistive walking device at this time. Patient has difficulty going to work, performing household chores, doing yard work or shopping, participating in recreational activities, and exercising at this time.  PRIOR PAIN TREATMENTS:  No prior pain treatment  PRIOR PAIN MEDICATIONS:  Tylenol  PATIENT PRESENTS FOR FOLLOW UP: This is a former Dr. Wright patient transferring his care to me. He was last seen in the office in October 2023 for complaints of neck and mid back pain. He denies any radicular symptoms into the lower extremities. He previously underwent a caudal ALMA on 8/30/23 which provided him with no sustained relief. He then underwent a bilateral L4-5 transforaminal ALMA on 9/13/23 which provided him with no relief as well. He has been pending physical therapy with workers compensation. He is referred back by Dr. Martinez for further evaluation to discuss possible RFA injection therapy. He is aware the first step would be diagnostic medial branch blocks and he is agreeable to move forward.   Of note, he worked for a short time after the incident. He reports his last day of work was April 23rd 2023.

## 2024-03-13 NOTE — PHYSICAL EXAM
[de-identified] : GENERAL APPEARANCE OF PATIENT IS WELL DEVELOPED, WELL NOURISHED, BODY HABITUS NORMAL, WELL GROOMED, NO DEFORMITIES NOTED. \par  Head - Atraumatic and Normocephalic \par  Eyes, Nose, and Throat: External inspection of ears and nose are normal overall without scars, lesions, or masses noted. Assessment of hearing is normal\par  Neck-Examination of neck shows no masses, overall appearance is normal, neck is symmetric, tracheal position is midline, no crepitus is noted. Examination of thyroid shows no enlargement, tenderness or masses\par  Respiratory- Assessment of respiratory effort shows no intercostal retractions, no use of accessory muscles, unlabored breathing, and normal diaphragmatic movement.\par  Cardiovascular- Examination of extremities show no edema or varicosities\par  Musculoskeletal. Examination of gait is not antalgic and station is normal\par  Inspection and palpation of digits and nails shows no clubbing, cyanosis, nodules, drainage, fluctuance, petechiae\par  \par  - Spine - 90 degrees in flexion. 10-15 degrees in extension with pain. \par  \par  \par  - Neck- pain at 10 degrees in both flexion and extension. Trigger point in the right cervical paravertebral area. Pain on right C3-6 facets. \par  \par  \par  - RUE- inspection and palpation shows no misalignment, asymmetry, crepitation, defects, tenderness, masses, effusions. ROM is normal without crepitation or contracture. No instability or subluxation or laxity is noted. No abnormal movements.\par  \par  \par  - LUE- inspection and palpation shows no misalignment, asymmetry, crepitation, defects, tenderness, masses, effusions. ROM is normal without crepitation or contracture. No instability or subluxation or laxity is noted. No abnormal movements.\par  \par  \par  - RLE- inspection and palpation shows no misalignment, asymmetry, crepitation, defects, tenderness, masses, effusions. ROM is normal without crepitation or contracture. No instability or subluxation or laxity is noted. No abnormal movements.\par  \par  \par  - LLE- inspection and palpation shows no misalignment, asymmetry, crepitation, defects, tenderness, masses, effusions. ROM is normal without crepitation or contracture. No instability or subluxation or laxity is noted. No abnormal movements.\par  \par  \par  - Skin- Inspection of skin and subcutaneous tissue shows no rashes, lesions or ulcers. Palpation of skin and subcutaneous tissue shows no rashes, no indurations, subcutaneous nodules or tightening.\par  \par  \par  - Abdomen- Nontender\par  \par  \par  - Neurologic- CN 2-12 are grossly intact. No sensory or motor deficits in the upper and lower extremities. Adequate strength in upper and lower extremities \par  \par  \par  - Psychiatric- Patient's judgment and insight are intact. Oriented to time, place and person. Recent and remote memory intact.\par

## 2024-03-18 ENCOUNTER — APPOINTMENT (OUTPATIENT)
Dept: PULMONOLOGY | Facility: CLINIC | Age: 59
End: 2024-03-18
Payer: COMMERCIAL

## 2024-03-18 VITALS
SYSTOLIC BLOOD PRESSURE: 138 MMHG | HEART RATE: 93 BPM | OXYGEN SATURATION: 96 % | WEIGHT: 230 LBS | DIASTOLIC BLOOD PRESSURE: 78 MMHG | BODY MASS INDEX: 32.08 KG/M2

## 2024-03-18 DIAGNOSIS — J44.9 CHRONIC OBSTRUCTIVE PULMONARY DISEASE, UNSPECIFIED: ICD-10-CM

## 2024-03-18 DIAGNOSIS — R91.8 OTHER NONSPECIFIC ABNORMAL FINDING OF LUNG FIELD: ICD-10-CM

## 2024-03-18 PROCEDURE — G2211 COMPLEX E/M VISIT ADD ON: CPT

## 2024-03-18 PROCEDURE — 99214 OFFICE O/P EST MOD 30 MIN: CPT

## 2024-03-18 NOTE — REASON FOR VISIT
[Initial] : an initial visit [Pulmonary Nodules] : pulmonary nodules [TextBox_44] : Patient is awakening at night hearing himself wheezing.  He states that happens every night.  He takes a puff or 2 of albuterol and then it goes away and he is able to fall back asleep.  He is concerned he is developing COPD.  We have a PFT from January 2023 which was normal.  He may be developing airway hyperreactivity due to the smoking.  He is not noticing any daytime symptomatology.  The patient is known to have pulmonary nodules.  He had a cardiac CT which shows stability of the small pulmonary nodule.  However he is due for a lung screening CAT scan.  He has been registered for the [facility] for rhinosinusitis and GERD.

## 2024-03-18 NOTE — HISTORY OF PRESENT ILLNESS
[TextBox_4] : I reviewed and interpreted any  OP CXR or CT available in the files I discussed the results today with the patient.  I reviewed the original evaluation and last notes.

## 2024-03-18 NOTE — ASSESSMENT
[FreeTextEntry1] : Patient needs screening CAT scan of the chest now. Assessment: Airway hyperreactivity Nicotine dependence  plan: Stress compliance with inhalers. Renewed today. cont PRN albuterol needs PFT weight loss screening CT chest now D/C smoking was discussed with the patient F/U 6 months

## 2024-03-18 NOTE — PHYSICAL EXAM
[No Acute Distress] : no acute distress [Normal Oropharynx] : normal oropharynx [No Neck Mass] : no neck mass [Normal Appearance] : normal appearance [Normal Rate/Rhythm] : normal rate/rhythm [Normal S1, S2] : normal s1, s2 [No Murmurs] : no murmurs [No Resp Distress] : no resp distress [Clear to Auscultation Bilaterally] : clear to auscultation bilaterally [Benign] : benign [No Abnormalities] : no abnormalities [Normal Gait] : normal gait [No Clubbing] : no clubbing [No Cyanosis] : no cyanosis [No Edema] : no edema [Normal Color/ Pigmentation] : normal color/ pigmentation [FROM] : FROM [No Focal Deficits] : no focal deficits [Oriented x3] : oriented x3 [Normal Affect] : normal affect

## 2024-03-25 ENCOUNTER — APPOINTMENT (OUTPATIENT)
Dept: CARDIOTHORACIC SURGERY | Facility: CLINIC | Age: 59
End: 2024-03-25
Payer: COMMERCIAL

## 2024-03-25 VITALS — HEIGHT: 71 IN | WEIGHT: 130 LBS | BODY MASS INDEX: 18.2 KG/M2

## 2024-03-25 DIAGNOSIS — Z80.1 FAMILY HISTORY OF MALIGNANT NEOPLASM OF TRACHEA, BRONCHUS AND LUNG: ICD-10-CM

## 2024-03-25 DIAGNOSIS — F17.210 NICOTINE DEPENDENCE, CIGARETTES, UNCOMPLICATED: ICD-10-CM

## 2024-03-25 PROCEDURE — G0296 VISIT TO DETERM LDCT ELIG: CPT

## 2024-03-25 NOTE — PLAN
[FreeTextEntry1] : Plan: -Low Dose CT chest for lung cancer screening -Follow up with patient and his referring provider after his LDCT results have been reviewed by the multi-disciplinary clinical team -Encouraged smoking cessation -[facility] Smokers Quitline literature shared with patient and Staying Smoke Free brochure reviewed -Smoking Cessation was offered, - patient wishes to cut back on his own, is down to 1/2 PPD   Should I Screen? tool utilized. 6 year risk of lung cancer is 2.1%. Patient wishes to proceed with screening.  Engaged in shared decision making with Mr. KEBEDE CAROLINE . Answered all questions. He verbalized understanding and agreement. He knows to call back with any questions or concerns

## 2024-03-25 NOTE — HISTORY OF PRESENT ILLNESS
[Current] : Current [>=20 Pack-Years] : Twenty pack years or greater smoking history: Yes [TextBox_13] : Referred by Dr. Buitrago  Mr. DELIO VELAZQUEZ  is a 58 year old man with a history of COPD. Low Back Pain, GERD.   He  was seen in the office by Dr. Buitrago for review of eligibility for, as well as, discussion of Low-Dose CT lung cancer screening program. Over the telephone today we reviewed and confirmed that the patient meets screening eligibility criteria: -Age: 58 year  Smoking status: -Current smoker -Number of pack(s) per day: 1 -Number of years smoked: 40 -Number of pack years smokin  Mr. VELAZQUEZ denies any signs or symptoms of lung cancer including new cough, change in cough, hemoptysis and unintentional weight loss.   Mr. VELAZQUEZ denies any personal history of lung cancer. No lung cancer in a 1st degree relative. Denies any history of lung disease.  WTC Exposure- worked in [employer]  [PacksperDay] : 1 [N_Years] : 40 [PacksperYear] : 40

## 2024-03-26 ENCOUNTER — APPOINTMENT (OUTPATIENT)
Dept: PAIN MANAGEMENT | Facility: CLINIC | Age: 59
End: 2024-03-26
Payer: OTHER MISCELLANEOUS

## 2024-03-26 PROCEDURE — 64493 INJ PARAVERT F JNT L/S 1 LEV: CPT | Mod: 50

## 2024-03-26 PROCEDURE — 00630 ANES PX LUMBAR REGION NOS: CPT | Mod: QZ,P2

## 2024-03-26 PROCEDURE — 93770 DETERMINATION VENOUS PRESS: CPT

## 2024-03-26 PROCEDURE — 94761 N-INVAS EAR/PLS OXIMETRY MLT: CPT

## 2024-03-26 PROCEDURE — 64495 INJ PARAVERT F JNT L/S 3 LEV: CPT | Mod: 50

## 2024-03-26 PROCEDURE — 64494 INJ PARAVERT F JNT L/S 2 LEV: CPT | Mod: 50

## 2024-03-26 PROCEDURE — 93040 RHYTHM ECG WITH REPORT: CPT | Mod: 79

## 2024-03-26 NOTE — PROCEDURE
[FreeTextEntry3] : LUMBAR MEDIAL BRANCH INJECTION UNDER FLUOROSCOPY     Date:  2024  Patient: Pool Ulloa  :  1965  Preoperative Diagnosis: Lumbar spondylosis; facet arthropathy bilateral L3 - S1  Postoperative Diagnosis: Same  Procedure: Diagnostic Lumbar median branch nerve injection, bilateral L3 - S1 under fluoroscopy  Phys[facility] Z[facility] Blue [facility], [facility]  Anesthesiologist/CRNA: Ms. Constantino  Anesthesia: See Nurses note. MAC/Lidocaine/Cold spray. Versed 6 mg     Medical Necessity:  Failure of conservative management.     Indication for Fluoroscopy:  This procedure requires the precise placement of the spinal needle.  It is the only way to accurately and safely perform the injection.     CONSENT: The possible complications including infection, bleeding, nerve damage, Hospital admission, death or failure of the procedure; though unusual, are theoretically possible. The patient was educated about the of the procedure and alternative therapies. All questions were answered and the patient freely gave consent to proceed.     Monitoring:  Patient had continuous blood pressure, EKG, and pulse oximetry throughout the case. See nurse's notes.     Procedure Note:   After obtaining written consent, the patient was placed on the fluoroscopic table in the prone position. A betadine prep was performed and the area surrounded by sterile drapes. A time out was performed. Fluoroscopy unit was positioned over the patient and images of the spine (AP and oblique views) obtained.  The entry sites for the above left L3-S1 levels median branch were determined and cold spray was used to localize the area. At each level a 22guage 3  inch spinal needle was placed at the level of the median branch to the facet under fluoroscopic guidance.  A dose of Lidocaine 2% 1cc was administered at each level. The needles at each level were withdrawn following administration of the medication intact. There were no signs of, intravascular block or hypotension. The needle was removed intact. A band aid was place on the site.     Complications: None. The patient tolerated the procedure well.      Disposition: I have examined the patient and there are no new physical findings since the original presentation.  Sensory and motor function were intact. The patient met discharge criteria see nurses notes. The discharge instruction sheet was reviewed and given to the patient. The patient was discharged home with a .     Comment: If 70% relief greater than 2 hours or 50% greater than 24 hours would proceed to RFA. If 50% less than 24 hours would repeat to confirm for RFA. Follow in office. Call if any problems.     Indication for RFA: The pt had a positive response to medial branch diagnostic injections and is considered a good candidate for the thermal RF ablation procedure. The diagnostic injection provided at least 50% reduction in pain for the duration of the local anesthetic.  The following criteria have been met: 1) failed response to at least 3 months of conservative management; 2) patient has LBP that is non-radicular, suggesting facet joint origin supported by absence of nerve root compression documented on the medical record on H&P and radiographic evaluation; 3) minimum of 6 months has elapsed since any prior RF treatments. If prior ablation therapy has been performed, it provided at least 50% relief for minimum of 10-12 weeks; 4) no prior spinal fusion at the vertebral level being treated;        This document was electronically signed by:   Zita Blue MD, FAAPMR Diplomate, American Board of Physical Medicine and Rehabilitation Diplomate, American Board of Pain Medicine

## 2024-04-25 ENCOUNTER — APPOINTMENT (OUTPATIENT)
Dept: PAIN MANAGEMENT | Facility: CLINIC | Age: 59
End: 2024-04-25
Payer: OTHER MISCELLANEOUS

## 2024-04-25 DIAGNOSIS — M46.96 UNSPECIFIED INFLAMMATORY SPONDYLOPATHY, LUMBAR REGION: ICD-10-CM

## 2024-04-25 PROCEDURE — 99214 OFFICE O/P EST MOD 30 MIN: CPT

## 2024-04-25 NOTE — HISTORY OF PRESENT ILLNESS
[FreeTextEntry1] : ORIGINAL PRESENTATION: This is a 58-year-old man complaining of neck and lower back pain. The patient has had this pain for 3 months. The pain started after a work-related injury which took place on 03/03/2023. 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 Patient describes pain as moderate to severe. During the last month the pain has been nearly constant with symptoms worsening in no typical pattern. Pain described as sharp and shooting. Pain is not associated with numbness or weakness.  Pain is increased with lying down, standing, sitting, walking, exercise, and coughing/sneezing. Bowel or bladder habits have not changed.   ACTIVITIES: Patient could walk 1 block before the pain starts. Patient can sit 1 hour  before pain starts. Patient can stand 30 minutes before pain starts. The patient sometimes lays down because of pain. Patient uses no assistive walking device at this time. Patient has difficulty going to work, performing household chores, doing yard work or shopping, participating in recreational activities, and exercising at this time.  PRIOR PAIN TREATMENTS:  No prior pain treatment  PRIOR PAIN MEDICATIONS:  Tylenol  PATIENT PRESENTS FOR FOLLOW UP: Last seen on 02/14/2024 and since then he underwent b/l L3-S1 MBB on  03/26/2024 and today reports at least 75% pain relief for the first 2 hours post procedure and 50% pain relief for first 48hours post procedure. He reports that his pain came back and is currently rating it as 7/10 most of the time.  He denies any radicular symptoms into the lower extremities. He previously underwent a caudal ALMA on 8/30/23 which provided him with no sustained relief. He then underwent a bilateral L4-5 transforaminal ALMA on 9/13/23 which provided him with no relief as well. He has been pending physical therapy with workers compensation. We will request second therapeutic/diagnostic MBB today.  Of note, he worked for a short time after the incident. He reports his last day of work was April 23rd 2023.

## 2024-04-25 NOTE — DISCUSSION/SUMMARY
[de-identified] : Mr. Pool Ulloa is a 59-year-old male with a chief complaint of neck and lower back pain. Pain resulted from a work-related injury which took place on 3/3/23. He was working for the [employer] lifting and throwing heavy objects when he injured his back and neck. His lower back pain is his chief complaint. Previous caudal ALMA injection provided him with no sustained relief. He then underwent a bilateral L4-5 transforaminal ALMA on 9/13/23 which provided him with no relief as well.  He underwent b/l L3-S1 MBB on  03/26/2024 and today reports at least 75% pain relief for the first 2 hours post procedure and 50% pain relief for first 48hours post procedure. He reports that his pain came back and is currently rating it as 7/10 most of the time. We will proceed with second  B/L L3-S1 diagnostic medical branch blocks w/ MAC. Patient will follow up afterwards for reassessment. All this patients questions were answered and the conversation was understood well.  Patient had paravertebral tenderness and pain on spinal movement with facet loading. Patient has trialed rehab (home exercise, physical therapy or chiropractic care) and medications. I will schedule a bilateral diagnostic lumbar medial branch injection L3-S1, if 70% immediate relief for greater than 30 minutes or 50% greater than 24 hours, would schedule RFA at lumbar medial branches. If greater than 50% intermediate relief for 30 minutes, would schedule a second diagnostic lumbar medial branch block, and if greater than 50% intermediate relief for 30 minutes, would schedule a RFA at lumbar medial branches.  The patient has severe anxiety of procedures that necessitates monitored anesthesia care (MAC). The procedure performed will be close to major nerves, arteries, and spinal cord and/or joint structures. Due to the proximity of these structures, we need the patient to be still during the procedure. With the help of MAC, this will be safely achieved and decrease the risk of any complications.  RISK AND BENEFIT PARAGRAPH: Risk, benefits, pros and cons of procedure were explained to the patient using models and diagrams and their questions were answered.   Total clinician time spent today on the patient is 30 minutes including preparing to see the patient, obtaining and/or reviewing and confirming history, performing medically necessary and appropriate examination, counseling and educating the patient and/or family, documenting clinical information in the EHR and communicating and/or referring to other healthcare professionals.    BEKAH Calhoun M.D., FAAPMR

## 2024-04-25 NOTE — PHYSICAL EXAM
[de-identified] : GENERAL APPEARANCE OF PATIENT IS WELL DEVELOPED, WELL NOURISHED, BODY HABITUS NORMAL, WELL GROOMED, NO DEFORMITIES NOTED. \par  Head - Atraumatic and Normocephalic \par  Eyes, Nose, and Throat: External inspection of ears and nose are normal overall without scars, lesions, or masses noted. Assessment of hearing is normal\par  Neck-Examination of neck shows no masses, overall appearance is normal, neck is symmetric, tracheal position is midline, no crepitus is noted. Examination of thyroid shows no enlargement, tenderness or masses\par  Respiratory- Assessment of respiratory effort shows no intercostal retractions, no use of accessory muscles, unlabored breathing, and normal diaphragmatic movement.\par  Cardiovascular- Examination of extremities show no edema or varicosities\par  Musculoskeletal. Examination of gait is not antalgic and station is normal\par  Inspection and palpation of digits and nails shows no clubbing, cyanosis, nodules, drainage, fluctuance, petechiae\par  \par  - Spine - 90 degrees in flexion. 10-15 degrees in extension with pain. \par  \par  \par  - Neck- pain at 10 degrees in both flexion and extension. Trigger point in the right cervical paravertebral area. Pain on right C3-6 facets. \par  \par  \par  - RUE- inspection and palpation shows no misalignment, asymmetry, crepitation, defects, tenderness, masses, effusions. ROM is normal without crepitation or contracture. No instability or subluxation or laxity is noted. No abnormal movements.\par  \par  \par  - LUE- inspection and palpation shows no misalignment, asymmetry, crepitation, defects, tenderness, masses, effusions. ROM is normal without crepitation or contracture. No instability or subluxation or laxity is noted. No abnormal movements.\par  \par  \par  - RLE- inspection and palpation shows no misalignment, asymmetry, crepitation, defects, tenderness, masses, effusions. ROM is normal without crepitation or contracture. No instability or subluxation or laxity is noted. No abnormal movements.\par  \par  \par  - LLE- inspection and palpation shows no misalignment, asymmetry, crepitation, defects, tenderness, masses, effusions. ROM is normal without crepitation or contracture. No instability or subluxation or laxity is noted. No abnormal movements.\par  \par  \par  - Skin- Inspection of skin and subcutaneous tissue shows no rashes, lesions or ulcers. Palpation of skin and subcutaneous tissue shows no rashes, no indurations, subcutaneous nodules or tightening.\par  \par  \par  - Abdomen- Nontender\par  \par  \par  - Neurologic- CN 2-12 are grossly intact. No sensory or motor deficits in the upper and lower extremities. Adequate strength in upper and lower extremities \par  \par  \par  - Psychiatric- Patient's judgment and insight are intact. Oriented to time, place and person. Recent and remote memory intact.\par   [] : pain with lateral rotation [Flexion] : flexion [Extension] : extension

## 2024-04-26 ENCOUNTER — RESULT REVIEW (OUTPATIENT)
Age: 59
End: 2024-04-26

## 2024-04-26 ENCOUNTER — OUTPATIENT (OUTPATIENT)
Dept: OUTPATIENT SERVICES | Facility: HOSPITAL | Age: 59
LOS: 1 days | End: 2024-04-26
Payer: COMMERCIAL

## 2024-04-26 DIAGNOSIS — Z12.2 ENCOUNTER FOR SCREENING FOR MALIGNANT NEOPLASM OF RESPIRATORY ORGANS: ICD-10-CM

## 2024-04-26 PROCEDURE — 71271 CT THORAX LUNG CANCER SCR C-: CPT | Mod: 26

## 2024-04-26 PROCEDURE — 71271 CT THORAX LUNG CANCER SCR C-: CPT

## 2024-04-27 DIAGNOSIS — Z12.2 ENCOUNTER FOR SCREENING FOR MALIGNANT NEOPLASM OF RESPIRATORY ORGANS: ICD-10-CM

## 2024-05-01 ENCOUNTER — NON-APPOINTMENT (OUTPATIENT)
Age: 59
End: 2024-05-01

## 2024-05-02 ENCOUNTER — NON-APPOINTMENT (OUTPATIENT)
Age: 59
End: 2024-05-02

## 2024-06-18 ENCOUNTER — APPOINTMENT (OUTPATIENT)
Dept: PAIN MANAGEMENT | Facility: CLINIC | Age: 59
End: 2024-06-18
Payer: OTHER MISCELLANEOUS

## 2024-06-18 DIAGNOSIS — M47.816 SPONDYLOSIS W/OUT MYELOPATHY OR RADICULOPATHY, LUMBAR REGION: ICD-10-CM

## 2024-06-18 PROCEDURE — 00630 ANES PX LUMBAR REGION NOS: CPT | Mod: QZ,P2

## 2024-06-18 PROCEDURE — 93040 RHYTHM ECG WITH REPORT: CPT | Mod: 79

## 2024-06-18 PROCEDURE — 94761 N-INVAS EAR/PLS OXIMETRY MLT: CPT

## 2024-06-18 PROCEDURE — 64493 INJ PARAVERT F JNT L/S 1 LEV: CPT | Mod: 50

## 2024-06-18 PROCEDURE — 64495 INJ PARAVERT F JNT L/S 3 LEV: CPT | Mod: 50

## 2024-06-18 PROCEDURE — 93770 DETERMINATION VENOUS PRESS: CPT

## 2024-06-18 PROCEDURE — 64494 INJ PARAVERT F JNT L/S 2 LEV: CPT | Mod: 50

## 2024-06-18 NOTE — PROCEDURE
[FreeTextEntry3] : LUMBAR MEDIAL BRANCH INJECTION UNDER FLUOROSCOPY     Date:  2024  Patient: Pool Ulloa  :  1965  Preoperative Diagnosis: Lumbar spondylosis; facet arthropathy bilateral L3 - S1  Postoperative Diagnosis: Same  Procedure: Diagnostic Lumbar median branch nerve injection, bilateral L3 - S1 under fluoroscopy  [facility]  Anesthesiologist/CRNA: Ms. Constantino  Anesthesia: See Nurses note. MAC/Lidocaine/Cold spray. Versed 4 mg  Medical Necessity:  Failure of conservative management.  Indication for Fluoroscopy:  This procedure requires the precise placement of the spinal needle.  It is the only way to accurately and safely perform the injection.  CONSENT: The possible complications including infection, bleeding, nerve damage, Hospital admission, death or failure of the procedure; though unusual, are theoretically possible. The patient was educated about the of the procedure and alternative therapies. All questions were answered and the patient freely gave consent to proceed.  Monitoring:  Patient had continuous blood pressure, EKG, and pulse oximetry throughout the case. See nurse's notes.   Procedure Note:   After obtaining written consent, the patient was placed on the fluoroscopic table in the prone position. A betadine prep was performed and the area surrounded by sterile drapes. A time out was performed. Fluoroscopy unit was positioned over the patient and images of the spine (AP and oblique views) obtained.  The entry sites for the above left L3-S1 levels median branch were determined and cold spray was used to localize the area. At each level a 22guage 3-1/2  inch spinal needle was placed at the level of the median branch to the facet under fluoroscopic guidance.  A dose of Lidocaine 2% 1cc was administered at each level. The needles at each level were withdrawn following administration of the medication intact. There were no signs of, intravascular block or hypotension. The needle was removed intact. A band aid was place on the site. The contralateral side was done in similar fashion.     Complications: None. The patient tolerated the procedure well.      Disposition: I have examined the patient and there are no new physical findings since the original presentation.  Sensory and motor function were intact. The patient met discharge criteria see nurses notes. The discharge instruction sheet was reviewed and given to the patient. The patient was discharged home with a .     Comment: If 70% relief greater than 2 hours or 50% greater than 24 hours would proceed to RFA. If 50% less than 24 hours would repeat to confirm for RFA. Follow in office. Call if any problems.     Indication for RFA: The pt had a positive response to medial branch diagnostic injections and is considered a good candidate for the thermal RF ablation procedure. The diagnostic injection provided at least 50% reduction in pain for the duration of the local anesthetic.  The following criteria have been met: 1) failed response to at least 3 months of conservative management; 2) patient has LBP that is non-radicular, suggesting facet joint origin supported by absence of nerve root compression documented on the medical record on H&P and radiographic evaluation; 3) minimum of 6 months has elapsed since any prior RF treatments. If prior ablation therapy has been performed, it provided at least 50% relief for minimum of 10-12 weeks; 4) no prior spinal fusion at the vertebral level being treated;        This document was electronically signed by:   Zita Blue MD, FAAPMR Diplomate, American Board of Physical Medicine and Rehabilitation Diplomate, American Board of Pain Medicine

## 2024-07-10 ENCOUNTER — APPOINTMENT (OUTPATIENT)
Dept: PAIN MANAGEMENT | Facility: CLINIC | Age: 59
End: 2024-07-10
Payer: OTHER MISCELLANEOUS

## 2024-07-10 DIAGNOSIS — M54.50 LOW BACK PAIN, UNSPECIFIED: ICD-10-CM

## 2024-07-10 DIAGNOSIS — M47.816 SPONDYLOSIS W/OUT MYELOPATHY OR RADICULOPATHY, LUMBAR REGION: ICD-10-CM

## 2024-07-10 PROCEDURE — 99214 OFFICE O/P EST MOD 30 MIN: CPT

## 2024-09-24 ENCOUNTER — APPOINTMENT (OUTPATIENT)
Dept: PAIN MANAGEMENT | Facility: CLINIC | Age: 59
End: 2024-09-24

## 2024-09-24 ENCOUNTER — APPOINTMENT (OUTPATIENT)
Facility: CLINIC | Age: 59
End: 2024-09-24

## 2024-09-24 PROCEDURE — 94761 N-INVAS EAR/PLS OXIMETRY MLT: CPT

## 2024-09-24 PROCEDURE — 64635 DESTROY LUMB/SAC FACET JNT: CPT | Mod: LT

## 2024-09-24 PROCEDURE — 01992 ANES DX/THER NRV BLK&INJ PRN: CPT | Mod: QZ,P2

## 2024-09-24 PROCEDURE — 93040 RHYTHM ECG WITH REPORT: CPT | Mod: 59

## 2024-09-24 PROCEDURE — 93770 DETERMINATION VENOUS PRESS: CPT

## 2024-09-24 PROCEDURE — 64636Z: CUSTOM | Mod: LT

## 2024-09-24 NOTE — PROCEDURE
[FreeTextEntry3] : Medial Branch Nerve Rhizotomy- Lumbar UNDER FLUORSCOPY     Date:  2024  Patient: Pool Ulloa  :  1965  Preoperative Diagnosis: Lumbar facet arthropathy left L3- S1  Postoperative Diagnosis: Same  Procedure: Neurotomy of the medial branch nerve of left L3-S1 under fluoroscopy.  [facility]     Anesthesiologist/CRNA: Ms. Constantino/Dr. Weiner  Anesthesia: See Nurses note. MAC/Lidocaine/Cold spray. Versed 4 mg, Fentanyl 100 mcg IVP     Medical Necessity: Failure of conservative medical management. Facet arthropathy; intractable axial pain amenable only to medial branch nerve injections. Criteria met for a medial branch nerve neurotomy.     Indication for Fluoroscopy:  This procedure requires the precise placement of the spinal needle into the epidural space.  It is the only way to accurately and safely perform the injection.     CONSENT: The possible complications including infection, bleeding, nerve damage, hospital admission, death or failure of the procedure; though unusual, are theoretically possible. The patient was educated about the of the procedure and alternative therapies. All questions were answered and the patient freely gave consent to proceed. The patient was also told that sometimes patients will notice lower extremity weakness immediately following the procedure due to extravasation of local anesthetic solution onto the main nerve root during the procedure. In addition, the patient was informed that 1 to 2 weeks of perioperative discomfort following the procedure is to be expected.     Monitoring:  Patient had continuous blood pressure, EKG, and pulse oximetry throughout the case. See nurse's notes.     PROCEDURE NOTE:  After obtaining written consent, the patient was placed in a prone position on a fluoroscopy table.  The back was prepped and draped in a sterile fashion and a C-arm fluoroscopic device was used for localization of the radiofrequency target sites. A time out was performed. The  mm disposable Gerson*-coated cannula with a 5 mm active tip was adjusted via the sizing collar so that the electrode fit just inside the inner bevel at the end of the bare metal. Prior to beginning the procedure, the internal test mode function of the radiofrequency unit was checked to make sure the machine was functioning properly. The initial target zones included the junction of the transverse process and the pedicle at each level.     At each target site, Cold spray was used to localize the area followed by 2% Lidocaine solution was used to anesthetize the skin and subcutaneous tissues and the radiofrequency cannula were placed in a parallel fashion to the x-ray beam and directed in a bulls-eye fashion down to the target zones. The cannula was then walked over the leading edge and advanced approximately 2 to 3 mm in an anterior direction. If patient gets sustained relief will have patient do modified planks 3 times a day on carpet or yoga mat starting at 5 seconds building up to 1 minute without grimacing/Valsalva and walking.     The second target zones included the superior and medial junction of the sacral ala. At each target site, a 2% Lidocaine solution was used to anesthetize the skin and subcutaneous tissues. Again, the radiofrequency cannulas were placed in a parallel fashion to the x-ray beam and directed in a bulls-eye fashion down to the target zones. The cannula was then walked over the leading edge and advanced approximately 2 to 3 mm in an anterior direction. A lateral view of the spine was performed to insure that the cannulas were not too far to the opening of the foraminal canals. Sensory stimulation at 50 hertz was performed at each target area. Referral of the sensory stimulation was noted at less than 1 volt over the paravertebral area or hip. Motor dissociation at 2 hertz was obtained by stimulating up to 3 times the voltage of the sensory stimulation and insuring a lack of motor movement in the lower extremities. Once the radiofrequency cannula was in correct position, a 2% Lidocaine with 10mg of depomedrol solution was used to rapidly anesthetize the lesion site. After a thirty second delay, thermal lesions were then created at each level for 90 seconds at a temperature of 80 C. After the lesions were created, the cannulas were removed intact and sterile bandages placed at the puncture sites..      Complications: None. The patient tolerated the procedure well.      Disposition: I have examined the patient and there are no new physical findings since the original presentation.  Sensory and motor function were intact. The patient met discharge criteria see nurses notes. The discharge instruction sheet was reviewed and given to the patient. The patient was discharged home with a . If patient gets sustained relief will have patient do modified planks 3 times a day on carpet or yoga mat starting at 5 seconds building up to 1 minute without grimacing/Valsalva and walking.     Comment: Dx MBI with 75% immediate relief for greater than 120 minutes. RFA today, follow up in 2-4 weeks. Call if any problem.     Indication for RFA: The pt had a positive response to medial branch diagnostic injections and is considered a good candidate for the thermal RF ablation procedure. The diagnostic injection provided at least 50% reduction in pain for the duration of the local anesthetic.   The following criteria have been met: 1) failed response to at least 3 months of conservative management; 2) patient has LBP that is non-radicular, suggesting facet joint origin supported by absence of nerve root compression documented on the medical record on H&P and radiographic evaluation; 3) minimum of 6 months has elapsed since any prior RF treatments. If prior ablation therapy has been performed, it provided at least 50% relief for minimum of 10-12 weeks; 4) no prior spinal fusion at the vertebral level being treated.     This document was electronically signed by:  Zita Blue MD, FAAPMR Diplomate, American Board of Physical Medicine and Rehabilitation Diplomate, American Board of Pain Medicine

## 2024-10-01 ENCOUNTER — APPOINTMENT (OUTPATIENT)
Facility: CLINIC | Age: 59
End: 2024-10-01

## 2024-10-01 ENCOUNTER — APPOINTMENT (OUTPATIENT)
Dept: PAIN MANAGEMENT | Facility: CLINIC | Age: 59
End: 2024-10-01

## 2024-10-01 DIAGNOSIS — M47.816 SPONDYLOSIS W/OUT MYELOPATHY OR RADICULOPATHY, LUMBAR REGION: ICD-10-CM

## 2024-10-01 PROCEDURE — 94761 N-INVAS EAR/PLS OXIMETRY MLT: CPT

## 2024-10-01 PROCEDURE — 64635 DESTROY LUMB/SAC FACET JNT: CPT | Mod: RT

## 2024-10-01 PROCEDURE — 93040 RHYTHM ECG WITH REPORT: CPT | Mod: 59

## 2024-10-01 PROCEDURE — 93770 DETERMINATION VENOUS PRESS: CPT

## 2024-10-01 PROCEDURE — 64636Z: CUSTOM | Mod: RT

## 2024-10-01 PROCEDURE — 01992 ANES DX/THER NRV BLK&INJ PRN: CPT | Mod: QZ,P2

## 2024-10-01 NOTE — PROCEDURE
[FreeTextEntry3] : Medial Branch Nerve Rhizotomy- Lumbar UNDER FLUORSCOPY     Date:  10/01/2024  Patient: Pool Ulloa  :  1965   Preoperative Diagnosis: Lumbar facet arthropathy L3- S1  Postoperative Diagnosis: Same  Procedure: Neurotomy of the medial branch nerve of right L3-S1 under fluoroscopy.  Physician: Zita Blue MD, FAAPMR  Anesthesiologist/CRNA: Mr. Sanchez/Dr. Bustamante  Anesthesia: See Nurses note. MAC/Lidocaine/Cold spray. Propofol 60 mg, Lidocaine 20 mg IVP     Medical Necessity: Failure of conservative medical management. Facet arthropathy; intractable axial pain amenable only to medial branch nerve injections. Criteria met for a medial branch nerve neurotomy.     Indication for Fluoroscopy:  This procedure requires the precise placement of the spinal needle into the epidural space.  It is the only way to accurately and safely perform the injection.     CONSENT: The possible complications including infection, bleeding, nerve damage, hospital admission, death or failure of the procedure; though unusual, are theoretically possible. The patient was educated about the of the procedure and alternative therapies. All questions were answered and the patient freely gave consent to proceed. The patient was also told that sometimes patients will notice lower extremity weakness immediately following the procedure due to extravasation of local anesthetic solution onto the main nerve root during the procedure. In addition, the patient was informed that 1 to 2 weeks of perioperative discomfort following the procedure is to be expected.     Monitoring:  Patient had continuous blood pressure, EKG, and pulse oximetry throughout the case. See nurse's notes.     PROCEDURE NOTE:  After obtaining written consent, the patient was placed in a prone position on a fluoroscopy table.  The back was prepped and draped in a sterile fashion and a C-arm fluoroscopic device was used for localization of the radiofrequency target sites. A time out was performed. The  mm disposable Gerson*-coated cannula with a 5 mm active tip was adjusted via the sizing collar so that the electrode fit just inside the inner bevel at the end of the bare metal. Prior to beginning the procedure, the internal test mode function of the radiofrequency unit was checked to make sure the machine was functioning properly. The initial target zones included the junction of the transverse process and the pedicle at each level.     At each target site, Cold spray was used to localize the area followed by 2% Lidocaine solution was used to anesthetize the skin and subcutaneous tissues and the radiofrequency cannula were placed in a parallel fashion to the x-ray beam and directed in a bulls-eye fashion down to the target zones. The cannula was then walked over the leading edge and advanced approximately 2 to 3 mm in an anterior direction. If patient gets sustained relief will have patient do modified planks 3 times a day on carpet or yoga mat starting at 5 seconds building up to 1 minute without grimacing/Valsalva and walking.     The second target zones included the superior and medial junction of the sacral ala. At each target site, a 2% Lidocaine solution was used to anesthetize the skin and subcutaneous tissues. Again, the radiofrequency cannulas were placed in a parallel fashion to the x-ray beam and directed in a bulls-eye fashion down to the target zones. The cannula was then walked over the leading edge and advanced approximately 2 to 3 mm in an anterior direction. A lateral view of the spine was performed to insure that the cannulas were not too far to the opening of the foraminal canals. Sensory stimulation at 50 hertz was performed at each target area. Referral of the sensory stimulation was noted at less than 1 volt over the paravertebral area or hip. Motor dissociation at 2 hertz was obtained by stimulating up to 3 times the voltage of the sensory stimulation and insuring a lack of motor movement in the lower extremities. Once the radiofrequency cannula was in correct position, a 2% Lidocaine with 10mg of depomedrol solution was used to rapidly anesthetize the lesion site. After a thirty second delay, thermal lesions were then created at each level for 90 seconds at a temperature of 80 C. After the lesions were created, the cannulas were removed intact and sterile bandages placed at the puncture sites..      Complications: None. The patient tolerated the procedure well.      Disposition: I have examined the patient and there are no new physical findings since the original presentation.  Sensory and motor function were intact. The patient met discharge criteria see nurses notes. The discharge instruction sheet was reviewed and given to the patient. The patient was discharged home with a . If patient gets sustained relief will have patient do modified planks 3 times a day on carpet or yoga mat starting at 5 seconds building up to 1 minute without grimacing/Valsalva and walking.     Comment: Dx MBI with  75% immediate relief for greater than 120 minutes. RFA today, follow up in 2-4 weeks. Call if in problem.     Indication for RFA: The pt had a positive response to medial branch diagnostic injections and is considered a good candidate for the thermal RF ablation procedure. The diagnostic injection provided at least 50% reduction in pain for the duration of the local anesthetic.   The following criteria have been met: 1) failed response to at least 3 months of conservative management; 2) patient has LBP that is non-radicular, suggesting facet joint origin supported by absence of nerve root compression documented on the medical record on H&P and radiographic evaluation; 3) minimum of 6 months has elapsed since any prior RF treatments. If prior ablation therapy has been performed, it provided at least 50% relief for minimum of 10-12 weeks; 4) no prior spinal fusion at the vertebral level being treated.     This document was electronically signed by:  Zita Blue MD, FAAPMR Diplomate, American Board of Physical Medicine and Rehabilitation Diplomate, American Board of Pain Medicine

## 2024-10-23 ENCOUNTER — APPOINTMENT (OUTPATIENT)
Dept: PAIN MANAGEMENT | Facility: CLINIC | Age: 59
End: 2024-10-23
Payer: OTHER MISCELLANEOUS

## 2024-10-23 DIAGNOSIS — M54.17 RADICULOPATHY, LUMBOSACRAL REGION: ICD-10-CM

## 2024-10-23 DIAGNOSIS — M47.816 SPONDYLOSIS W/OUT MYELOPATHY OR RADICULOPATHY, LUMBAR REGION: ICD-10-CM

## 2024-10-23 DIAGNOSIS — M54.50 LOW BACK PAIN, UNSPECIFIED: ICD-10-CM

## 2024-10-23 DIAGNOSIS — M48.07 SPINAL STENOSIS, LUMBOSACRAL REGION: ICD-10-CM

## 2024-10-23 PROCEDURE — 99214 OFFICE O/P EST MOD 30 MIN: CPT

## 2024-12-13 ENCOUNTER — APPOINTMENT (OUTPATIENT)
Dept: PAIN MANAGEMENT | Facility: CLINIC | Age: 59
End: 2024-12-13
Payer: OTHER MISCELLANEOUS

## 2024-12-13 ENCOUNTER — APPOINTMENT (OUTPATIENT)
Facility: CLINIC | Age: 59
End: 2024-12-13
Payer: OTHER MISCELLANEOUS

## 2024-12-13 DIAGNOSIS — M54.17 RADICULOPATHY, LUMBOSACRAL REGION: ICD-10-CM

## 2024-12-13 PROCEDURE — 93770 DETERMINATION VENOUS PRESS: CPT

## 2024-12-13 PROCEDURE — 00630 ANES PX LUMBAR REGION NOS: CPT | Mod: QZ,P2

## 2024-12-13 PROCEDURE — 64484 NJX AA&/STRD TFRM EPI L/S EA: CPT | Mod: LT

## 2024-12-13 PROCEDURE — 64483 NJX AA&/STRD TFRM EPI L/S 1: CPT | Mod: LT

## 2024-12-13 PROCEDURE — 94761 N-INVAS EAR/PLS OXIMETRY MLT: CPT

## 2024-12-13 PROCEDURE — 93040 RHYTHM ECG WITH REPORT: CPT | Mod: 79

## 2025-01-08 ENCOUNTER — APPOINTMENT (OUTPATIENT)
Dept: PAIN MANAGEMENT | Facility: CLINIC | Age: 60
End: 2025-01-08
Payer: OTHER MISCELLANEOUS

## 2025-01-08 VITALS — BODY MASS INDEX: 18.2 KG/M2 | WEIGHT: 130 LBS | HEIGHT: 71 IN

## 2025-01-08 DIAGNOSIS — M54.50 LOW BACK PAIN, UNSPECIFIED: ICD-10-CM

## 2025-01-08 DIAGNOSIS — M46.96 UNSPECIFIED INFLAMMATORY SPONDYLOPATHY, LUMBAR REGION: ICD-10-CM

## 2025-01-08 DIAGNOSIS — M47.816 SPONDYLOSIS W/OUT MYELOPATHY OR RADICULOPATHY, LUMBAR REGION: ICD-10-CM

## 2025-01-08 DIAGNOSIS — M54.16 RADICULOPATHY, LUMBAR REGION: ICD-10-CM

## 2025-01-08 PROCEDURE — 99213 OFFICE O/P EST LOW 20 MIN: CPT

## 2025-01-08 RX ORDER — ORAL SEMAGLUTIDE 14 MG/1
TABLET ORAL
Refills: 0 | Status: ACTIVE | COMMUNITY

## 2025-01-22 ENCOUNTER — APPOINTMENT (OUTPATIENT)
Dept: PAIN MANAGEMENT | Facility: CLINIC | Age: 60
End: 2025-01-22
Payer: OTHER MISCELLANEOUS

## 2025-01-22 DIAGNOSIS — M54.2 CERVICALGIA: ICD-10-CM

## 2025-01-22 DIAGNOSIS — M54.50 LOW BACK PAIN, UNSPECIFIED: ICD-10-CM

## 2025-01-22 DIAGNOSIS — M54.16 RADICULOPATHY, LUMBAR REGION: ICD-10-CM

## 2025-01-22 DIAGNOSIS — M46.96 UNSPECIFIED INFLAMMATORY SPONDYLOPATHY, LUMBAR REGION: ICD-10-CM

## 2025-01-22 DIAGNOSIS — M25.9 JOINT DISORDER, UNSPECIFIED: ICD-10-CM

## 2025-01-22 DIAGNOSIS — M47.816 SPONDYLOSIS W/OUT MYELOPATHY OR RADICULOPATHY, LUMBAR REGION: ICD-10-CM

## 2025-01-22 PROCEDURE — 99214 OFFICE O/P EST MOD 30 MIN: CPT

## 2025-01-22 RX ORDER — TIZANIDINE 4 MG/1
4 TABLET ORAL 3 TIMES DAILY
Qty: 90 | Refills: 0 | Status: ACTIVE | COMMUNITY
Start: 2025-01-22 | End: 1900-01-01

## 2025-02-06 RX ORDER — DIAZEPAM 5 MG/1
5 TABLET ORAL
Qty: 2 | Refills: 0 | Status: ACTIVE | COMMUNITY
Start: 2025-02-06 | End: 1900-01-01

## 2025-02-21 ENCOUNTER — APPOINTMENT (OUTPATIENT)
Dept: PAIN MANAGEMENT | Facility: CLINIC | Age: 60
End: 2025-02-21
Payer: OTHER MISCELLANEOUS

## 2025-02-21 DIAGNOSIS — M53.3 SACROCOCCYGEAL DISORDERS, NOT ELSEWHERE CLASSIFIED: ICD-10-CM

## 2025-02-21 PROCEDURE — 94761 N-INVAS EAR/PLS OXIMETRY MLT: CPT

## 2025-02-21 PROCEDURE — 93770 DETERMINATION VENOUS PRESS: CPT

## 2025-02-21 PROCEDURE — 27096 INJECT SACROILIAC JOINT: CPT | Mod: LT

## 2025-02-21 PROCEDURE — 93040 RHYTHM ECG WITH REPORT: CPT | Mod: 79

## 2025-03-05 ENCOUNTER — APPOINTMENT (OUTPATIENT)
Dept: PAIN MANAGEMENT | Facility: CLINIC | Age: 60
End: 2025-03-05
Payer: OTHER MISCELLANEOUS

## 2025-03-05 DIAGNOSIS — M54.16 RADICULOPATHY, LUMBAR REGION: ICD-10-CM

## 2025-03-05 DIAGNOSIS — M54.2 CERVICALGIA: ICD-10-CM

## 2025-03-05 DIAGNOSIS — M25.9 JOINT DISORDER, UNSPECIFIED: ICD-10-CM

## 2025-03-05 DIAGNOSIS — M54.50 LOW BACK PAIN, UNSPECIFIED: ICD-10-CM

## 2025-03-05 DIAGNOSIS — M46.96 UNSPECIFIED INFLAMMATORY SPONDYLOPATHY, LUMBAR REGION: ICD-10-CM

## 2025-03-05 DIAGNOSIS — M47.816 SPONDYLOSIS W/OUT MYELOPATHY OR RADICULOPATHY, LUMBAR REGION: ICD-10-CM

## 2025-03-05 PROCEDURE — 99214 OFFICE O/P EST MOD 30 MIN: CPT

## 2025-03-06 ENCOUNTER — APPOINTMENT (OUTPATIENT)
Dept: PULMONOLOGY | Facility: CLINIC | Age: 60
End: 2025-03-06
Payer: COMMERCIAL

## 2025-03-06 VITALS
DIASTOLIC BLOOD PRESSURE: 80 MMHG | SYSTOLIC BLOOD PRESSURE: 124 MMHG | BODY MASS INDEX: 29.66 KG/M2 | WEIGHT: 219 LBS | HEIGHT: 72 IN

## 2025-03-06 DIAGNOSIS — R91.8 OTHER NONSPECIFIC ABNORMAL FINDING OF LUNG FIELD: ICD-10-CM

## 2025-03-06 DIAGNOSIS — J44.9 CHRONIC OBSTRUCTIVE PULMONARY DISEASE, UNSPECIFIED: ICD-10-CM

## 2025-03-06 PROCEDURE — G2211 COMPLEX E/M VISIT ADD ON: CPT | Mod: NC

## 2025-03-06 PROCEDURE — 99213 OFFICE O/P EST LOW 20 MIN: CPT

## 2025-03-06 RX ORDER — ALBUTEROL SULFATE 90 UG/1
108 (90 BASE) INHALANT RESPIRATORY (INHALATION)
Qty: 1 | Refills: 3 | Status: ACTIVE | COMMUNITY
Start: 2025-03-06 | End: 1900-01-01

## 2025-03-12 ENCOUNTER — APPOINTMENT (OUTPATIENT)
Dept: CARDIOTHORACIC SURGERY | Facility: CLINIC | Age: 60
End: 2025-03-12
Payer: COMMERCIAL

## 2025-03-12 VITALS — WEIGHT: 219 LBS | HEIGHT: 72 IN | BODY MASS INDEX: 29.66 KG/M2

## 2025-03-12 PROCEDURE — G0296 VISIT TO DETERM LDCT ELIG: CPT

## 2025-04-06 ENCOUNTER — NON-APPOINTMENT (OUTPATIENT)
Age: 60
End: 2025-04-06

## 2025-04-10 ENCOUNTER — OUTPATIENT (OUTPATIENT)
Dept: OUTPATIENT SERVICES | Facility: HOSPITAL | Age: 60
LOS: 1 days | End: 2025-04-10
Payer: COMMERCIAL

## 2025-04-10 ENCOUNTER — RESULT REVIEW (OUTPATIENT)
Age: 60
End: 2025-04-10

## 2025-04-10 DIAGNOSIS — R91.1 SOLITARY PULMONARY NODULE: ICD-10-CM

## 2025-04-10 DIAGNOSIS — Z00.8 ENCOUNTER FOR OTHER GENERAL EXAMINATION: ICD-10-CM

## 2025-04-10 PROCEDURE — 71271 CT THORAX LUNG CANCER SCR C-: CPT

## 2025-04-10 PROCEDURE — 71271 CT THORAX LUNG CANCER SCR C-: CPT | Mod: 26

## 2025-04-11 DIAGNOSIS — R91.1 SOLITARY PULMONARY NODULE: ICD-10-CM

## 2025-04-16 ENCOUNTER — APPOINTMENT (OUTPATIENT)
Dept: PAIN MANAGEMENT | Facility: CLINIC | Age: 60
End: 2025-04-16
Payer: OTHER MISCELLANEOUS

## 2025-04-16 DIAGNOSIS — M47.816 SPONDYLOSIS W/OUT MYELOPATHY OR RADICULOPATHY, LUMBAR REGION: ICD-10-CM

## 2025-04-16 DIAGNOSIS — M46.96 UNSPECIFIED INFLAMMATORY SPONDYLOPATHY, LUMBAR REGION: ICD-10-CM

## 2025-04-16 DIAGNOSIS — M54.2 CERVICALGIA: ICD-10-CM

## 2025-04-16 DIAGNOSIS — M54.50 LOW BACK PAIN, UNSPECIFIED: ICD-10-CM

## 2025-04-16 DIAGNOSIS — M25.9 JOINT DISORDER, UNSPECIFIED: ICD-10-CM

## 2025-04-16 DIAGNOSIS — M54.16 RADICULOPATHY, LUMBAR REGION: ICD-10-CM

## 2025-04-16 PROCEDURE — 99213 OFFICE O/P EST LOW 20 MIN: CPT

## 2025-04-21 ENCOUNTER — NON-APPOINTMENT (OUTPATIENT)
Age: 60
End: 2025-04-21

## 2025-06-25 ENCOUNTER — APPOINTMENT (OUTPATIENT)
Dept: PAIN MANAGEMENT | Facility: CLINIC | Age: 60
End: 2025-06-25
Payer: OTHER MISCELLANEOUS

## 2025-06-25 PROCEDURE — 99214 OFFICE O/P EST MOD 30 MIN: CPT

## 2025-06-25 RX ORDER — SEMAGLUTIDE 2.68 MG/ML
INJECTION, SOLUTION SUBCUTANEOUS
Refills: 0 | Status: ACTIVE | COMMUNITY

## 2025-07-17 ENCOUNTER — APPOINTMENT (OUTPATIENT)
Dept: MRI IMAGING | Facility: CLINIC | Age: 60
End: 2025-07-17
Payer: OTHER MISCELLANEOUS

## 2025-07-17 PROCEDURE — 72141 MRI NECK SPINE W/O DYE: CPT

## 2025-07-29 ENCOUNTER — APPOINTMENT (OUTPATIENT)
Dept: NEUROLOGY | Facility: CLINIC | Age: 60
End: 2025-07-29
Payer: OTHER MISCELLANEOUS

## 2025-07-29 PROCEDURE — 95911 NRV CNDJ TEST 9-10 STUDIES: CPT

## 2025-07-29 PROCEDURE — 95886 MUSC TEST DONE W/N TEST COMP: CPT

## 2025-08-13 ENCOUNTER — APPOINTMENT (OUTPATIENT)
Dept: PAIN MANAGEMENT | Facility: CLINIC | Age: 60
End: 2025-08-13

## 2025-08-13 DIAGNOSIS — M54.2 CERVICALGIA: ICD-10-CM

## 2025-08-13 DIAGNOSIS — M25.9 JOINT DISORDER, UNSPECIFIED: ICD-10-CM

## 2025-08-13 DIAGNOSIS — M46.96 UNSPECIFIED INFLAMMATORY SPONDYLOPATHY, LUMBAR REGION: ICD-10-CM

## 2025-08-13 DIAGNOSIS — M54.50 LOW BACK PAIN, UNSPECIFIED: ICD-10-CM

## 2025-08-13 DIAGNOSIS — M48.07 SPINAL STENOSIS, LUMBOSACRAL REGION: ICD-10-CM

## 2025-08-13 DIAGNOSIS — M54.16 RADICULOPATHY, LUMBAR REGION: ICD-10-CM

## 2025-08-13 DIAGNOSIS — M47.22 OTHER SPONDYLOSIS WITH RADICULOPATHY, CERVICAL REGION: ICD-10-CM

## 2025-08-13 PROCEDURE — 99214 OFFICE O/P EST MOD 30 MIN: CPT

## 2025-08-27 ENCOUNTER — APPOINTMENT (OUTPATIENT)
Facility: CLINIC | Age: 60
End: 2025-08-27
Payer: OTHER MISCELLANEOUS

## 2025-08-27 DIAGNOSIS — M47.816 SPONDYLOSIS W/OUT MYELOPATHY OR RADICULOPATHY, LUMBAR REGION: ICD-10-CM

## 2025-08-27 PROCEDURE — 99213 OFFICE O/P EST LOW 20 MIN: CPT
